# Patient Record
Sex: MALE | Race: WHITE | NOT HISPANIC OR LATINO | Employment: OTHER | URBAN - METROPOLITAN AREA
[De-identification: names, ages, dates, MRNs, and addresses within clinical notes are randomized per-mention and may not be internally consistent; named-entity substitution may affect disease eponyms.]

---

## 2023-06-19 ENCOUNTER — HOSPITAL ENCOUNTER (INPATIENT)
Facility: CLINIC | Age: 78
LOS: 2 days | Discharge: HOME | DRG: 308 | End: 2023-06-21
Attending: INTERNAL MEDICINE | Admitting: INTERNAL MEDICINE
Payer: MEDICARE

## 2023-06-19 DIAGNOSIS — I48.91 ATRIAL FIBRILLATION WITH RVR (H): Primary | ICD-10-CM

## 2023-06-19 DIAGNOSIS — G62.9 NEUROPATHY: ICD-10-CM

## 2023-06-19 PROCEDURE — 200N000001 HC R&B ICU

## 2023-06-19 RX ORDER — NICOTINE POLACRILEX 4 MG
15-30 LOZENGE BUCCAL
Status: DISCONTINUED | OUTPATIENT
Start: 2023-06-19 | End: 2023-06-21 | Stop reason: HOSPADM

## 2023-06-19 RX ORDER — ONDANSETRON 4 MG/1
4 TABLET, ORALLY DISINTEGRATING ORAL EVERY 6 HOURS PRN
Status: DISCONTINUED | OUTPATIENT
Start: 2023-06-19 | End: 2023-06-21 | Stop reason: HOSPADM

## 2023-06-19 RX ORDER — DEXTROSE MONOHYDRATE 25 G/50ML
25-50 INJECTION, SOLUTION INTRAVENOUS
Status: DISCONTINUED | OUTPATIENT
Start: 2023-06-19 | End: 2023-06-21 | Stop reason: HOSPADM

## 2023-06-19 RX ORDER — AMOXICILLIN 250 MG
1 CAPSULE ORAL 2 TIMES DAILY PRN
Status: DISCONTINUED | OUTPATIENT
Start: 2023-06-19 | End: 2023-06-21 | Stop reason: HOSPADM

## 2023-06-19 RX ORDER — ONDANSETRON 2 MG/ML
4 INJECTION INTRAMUSCULAR; INTRAVENOUS EVERY 6 HOURS PRN
Status: DISCONTINUED | OUTPATIENT
Start: 2023-06-19 | End: 2023-06-21 | Stop reason: HOSPADM

## 2023-06-19 RX ORDER — AMOXICILLIN 250 MG
2 CAPSULE ORAL 2 TIMES DAILY PRN
Status: DISCONTINUED | OUTPATIENT
Start: 2023-06-19 | End: 2023-06-21 | Stop reason: HOSPADM

## 2023-06-19 RX ORDER — DILTIAZEM HCL/D5W 125 MG/125
5-15 PLASTIC BAG, INJECTION (ML) INTRAVENOUS CONTINUOUS
Status: DISCONTINUED | OUTPATIENT
Start: 2023-06-20 | End: 2023-06-20

## 2023-06-20 ENCOUNTER — APPOINTMENT (OUTPATIENT)
Dept: CARDIOLOGY | Facility: CLINIC | Age: 78
DRG: 308 | End: 2023-06-20
Attending: HOSPITALIST
Payer: MEDICARE

## 2023-06-20 LAB
ALBUMIN SERPL BCG-MCNC: 3.6 G/DL (ref 3.5–5.2)
ALP SERPL-CCNC: 212 U/L (ref 40–129)
ALT SERPL W P-5'-P-CCNC: 52 U/L (ref 0–70)
ANION GAP SERPL CALCULATED.3IONS-SCNC: 10 MMOL/L (ref 7–15)
ANION GAP SERPL CALCULATED.3IONS-SCNC: 14 MMOL/L (ref 7–15)
AST SERPL W P-5'-P-CCNC: 46 U/L (ref 0–45)
BILIRUB SERPL-MCNC: 0.6 MG/DL
BUN SERPL-MCNC: 23.9 MG/DL (ref 8–23)
BUN SERPL-MCNC: 26.3 MG/DL (ref 8–23)
CALCIUM SERPL-MCNC: 8.8 MG/DL (ref 8.8–10.2)
CALCIUM SERPL-MCNC: 9.2 MG/DL (ref 8.8–10.2)
CHLORIDE SERPL-SCNC: 98 MMOL/L (ref 98–107)
CHLORIDE SERPL-SCNC: 99 MMOL/L (ref 98–107)
CREAT SERPL-MCNC: 1.61 MG/DL (ref 0.67–1.17)
CREAT SERPL-MCNC: 1.72 MG/DL (ref 0.67–1.17)
DEPRECATED HCO3 PLAS-SCNC: 24 MMOL/L (ref 22–29)
DEPRECATED HCO3 PLAS-SCNC: 27 MMOL/L (ref 22–29)
ERYTHROCYTE [DISTWIDTH] IN BLOOD BY AUTOMATED COUNT: 15.7 % (ref 10–15)
ERYTHROCYTE [DISTWIDTH] IN BLOOD BY AUTOMATED COUNT: 15.7 % (ref 10–15)
GFR SERPL CREATININE-BSD FRML MDRD: 40 ML/MIN/1.73M2
GFR SERPL CREATININE-BSD FRML MDRD: 43 ML/MIN/1.73M2
GLUCOSE BLDC GLUCOMTR-MCNC: 116 MG/DL (ref 70–99)
GLUCOSE BLDC GLUCOMTR-MCNC: 123 MG/DL (ref 70–99)
GLUCOSE BLDC GLUCOMTR-MCNC: 127 MG/DL (ref 70–99)
GLUCOSE SERPL-MCNC: 108 MG/DL (ref 70–99)
GLUCOSE SERPL-MCNC: 113 MG/DL (ref 70–99)
HBA1C MFR BLD: 5.9 %
HCT VFR BLD AUTO: 37.7 % (ref 40–53)
HCT VFR BLD AUTO: 38.9 % (ref 40–53)
HGB BLD-MCNC: 12.5 G/DL (ref 13.3–17.7)
HGB BLD-MCNC: 12.7 G/DL (ref 13.3–17.7)
LVEF ECHO: NORMAL
MAGNESIUM SERPL-MCNC: 2.1 MG/DL (ref 1.7–2.3)
MAGNESIUM SERPL-MCNC: 2.1 MG/DL (ref 1.7–2.3)
MCH RBC QN AUTO: 30.3 PG (ref 26.5–33)
MCH RBC QN AUTO: 30.5 PG (ref 26.5–33)
MCHC RBC AUTO-ENTMCNC: 32.6 G/DL (ref 31.5–36.5)
MCHC RBC AUTO-ENTMCNC: 33.2 G/DL (ref 31.5–36.5)
MCV RBC AUTO: 92 FL (ref 78–100)
MCV RBC AUTO: 93 FL (ref 78–100)
PLATELET # BLD AUTO: 169 10E3/UL (ref 150–450)
PLATELET # BLD AUTO: 177 10E3/UL (ref 150–450)
POTASSIUM SERPL-SCNC: 3.5 MMOL/L (ref 3.4–5.3)
POTASSIUM SERPL-SCNC: 3.9 MMOL/L (ref 3.4–5.3)
PROT SERPL-MCNC: 6.5 G/DL (ref 6.4–8.3)
RBC # BLD AUTO: 4.1 10E6/UL (ref 4.4–5.9)
RBC # BLD AUTO: 4.19 10E6/UL (ref 4.4–5.9)
SODIUM SERPL-SCNC: 136 MMOL/L (ref 136–145)
SODIUM SERPL-SCNC: 136 MMOL/L (ref 136–145)
TROPONIN T SERPL HS-MCNC: 73 NG/L
TROPONIN T SERPL HS-MCNC: 74 NG/L
TSH SERPL DL<=0.005 MIU/L-ACNC: 2.83 UIU/ML (ref 0.3–4.2)
WBC # BLD AUTO: 6.2 10E3/UL (ref 4–11)
WBC # BLD AUTO: 6.6 10E3/UL (ref 4–11)

## 2023-06-20 PROCEDURE — 250N000011 HC RX IP 250 OP 636

## 2023-06-20 PROCEDURE — 83735 ASSAY OF MAGNESIUM: CPT

## 2023-06-20 PROCEDURE — 84484 ASSAY OF TROPONIN QUANT: CPT | Performed by: INTERNAL MEDICINE

## 2023-06-20 PROCEDURE — 83735 ASSAY OF MAGNESIUM: CPT | Performed by: HOSPITALIST

## 2023-06-20 PROCEDURE — 85027 COMPLETE CBC AUTOMATED: CPT

## 2023-06-20 PROCEDURE — 250N000013 HC RX MED GY IP 250 OP 250 PS 637: Performed by: HOSPITALIST

## 2023-06-20 PROCEDURE — 80053 COMPREHEN METABOLIC PANEL: CPT

## 2023-06-20 PROCEDURE — 84484 ASSAY OF TROPONIN QUANT: CPT

## 2023-06-20 PROCEDURE — 99223 1ST HOSP IP/OBS HIGH 75: CPT | Mod: AI | Performed by: INTERNAL MEDICINE

## 2023-06-20 PROCEDURE — 93005 ELECTROCARDIOGRAM TRACING: CPT

## 2023-06-20 PROCEDURE — 255N000002 HC RX 255 OP 636: Performed by: HOSPITALIST

## 2023-06-20 PROCEDURE — 250N000013 HC RX MED GY IP 250 OP 250 PS 637: Performed by: INTERNAL MEDICINE

## 2023-06-20 PROCEDURE — 36415 COLL VENOUS BLD VENIPUNCTURE: CPT

## 2023-06-20 PROCEDURE — 120N000004 HC R&B MS OVERFLOW

## 2023-06-20 PROCEDURE — 82374 ASSAY BLOOD CARBON DIOXIDE: CPT

## 2023-06-20 PROCEDURE — 250N000009 HC RX 250: Performed by: HOSPITALIST

## 2023-06-20 PROCEDURE — 999N000208 ECHOCARDIOGRAM COMPLETE

## 2023-06-20 PROCEDURE — 36415 COLL VENOUS BLD VENIPUNCTURE: CPT | Performed by: INTERNAL MEDICINE

## 2023-06-20 PROCEDURE — 93306 TTE W/DOPPLER COMPLETE: CPT | Mod: 26 | Performed by: INTERNAL MEDICINE

## 2023-06-20 PROCEDURE — 84443 ASSAY THYROID STIM HORMONE: CPT | Performed by: INTERNAL MEDICINE

## 2023-06-20 PROCEDURE — 99223 1ST HOSP IP/OBS HIGH 75: CPT | Performed by: INTERNAL MEDICINE

## 2023-06-20 PROCEDURE — 83036 HEMOGLOBIN GLYCOSYLATED A1C: CPT | Performed by: HOSPITALIST

## 2023-06-20 RX ORDER — LATANOPROST 50 UG/ML
1 SOLUTION/ DROPS OPHTHALMIC EVERY EVENING
Status: DISCONTINUED | OUTPATIENT
Start: 2023-06-20 | End: 2023-06-21 | Stop reason: HOSPADM

## 2023-06-20 RX ORDER — APIXABAN 2.5 MG/1
1 TABLET, FILM COATED ORAL 2 TIMES DAILY
COMMUNITY
Start: 2023-06-12

## 2023-06-20 RX ORDER — ACETAMINOPHEN 325 MG/1
650 TABLET ORAL EVERY 4 HOURS PRN
Status: DISCONTINUED | OUTPATIENT
Start: 2023-06-20 | End: 2023-06-21 | Stop reason: HOSPADM

## 2023-06-20 RX ORDER — TEMAZEPAM 7.5 MG/1
7.5 CAPSULE ORAL
Status: ON HOLD | COMMUNITY
End: 2023-06-20

## 2023-06-20 RX ORDER — TEMAZEPAM 7.5 MG/1
30 CAPSULE ORAL AT BEDTIME
Status: DISCONTINUED | OUTPATIENT
Start: 2023-06-20 | End: 2023-06-21 | Stop reason: HOSPADM

## 2023-06-20 RX ORDER — AMOXICILLIN 250 MG
2 CAPSULE ORAL DAILY PRN
COMMUNITY

## 2023-06-20 RX ORDER — PRAVASTATIN SODIUM 20 MG
40 TABLET ORAL AT BEDTIME
Status: DISCONTINUED | OUTPATIENT
Start: 2023-06-20 | End: 2023-06-20

## 2023-06-20 RX ORDER — POTASSIUM CHLORIDE 1500 MG/1
1 TABLET, EXTENDED RELEASE ORAL
COMMUNITY
Start: 2023-05-30

## 2023-06-20 RX ORDER — TORSEMIDE 20 MG/1
40 TABLET ORAL
Status: DISCONTINUED | OUTPATIENT
Start: 2023-06-20 | End: 2023-06-21 | Stop reason: HOSPADM

## 2023-06-20 RX ORDER — TEMAZEPAM 30 MG
1 CAPSULE ORAL AT BEDTIME
COMMUNITY
Start: 2023-05-25

## 2023-06-20 RX ORDER — CEPHALEXIN 500 MG/1
500 CAPSULE ORAL 3 TIMES DAILY
Status: DISCONTINUED | OUTPATIENT
Start: 2023-06-20 | End: 2023-06-21 | Stop reason: HOSPADM

## 2023-06-20 RX ORDER — SIMETHICONE 80 MG
80 TABLET,CHEWABLE ORAL EVERY 6 HOURS PRN
Status: DISCONTINUED | OUTPATIENT
Start: 2023-06-20 | End: 2023-06-21 | Stop reason: HOSPADM

## 2023-06-20 RX ORDER — LISINOPRIL 2.5 MG/1
1 TABLET ORAL DAILY
COMMUNITY
Start: 2023-04-20

## 2023-06-20 RX ORDER — IBUPROFEN 200 MG
400 TABLET ORAL EVERY 8 HOURS PRN
Status: ON HOLD | COMMUNITY
End: 2023-06-21

## 2023-06-20 RX ORDER — LATANOPROST 50 UG/ML
1 SOLUTION/ DROPS OPHTHALMIC EVERY EVENING
COMMUNITY
Start: 2023-02-27

## 2023-06-20 RX ORDER — ATORVASTATIN CALCIUM 20 MG/1
40 TABLET, FILM COATED ORAL EVERY EVENING
Status: DISCONTINUED | OUTPATIENT
Start: 2023-06-20 | End: 2023-06-20

## 2023-06-20 RX ORDER — GABAPENTIN 300 MG/1
600 CAPSULE ORAL 3 TIMES DAILY
Status: DISCONTINUED | OUTPATIENT
Start: 2023-06-20 | End: 2023-06-21 | Stop reason: HOSPADM

## 2023-06-20 RX ORDER — METOPROLOL TARTRATE 25 MG/1
25 TABLET, FILM COATED ORAL EVERY 6 HOURS
Status: DISCONTINUED | OUTPATIENT
Start: 2023-06-20 | End: 2023-06-21

## 2023-06-20 RX ORDER — CEPHALEXIN 500 MG/1
1 CAPSULE ORAL 3 TIMES DAILY
COMMUNITY
Start: 2023-06-19

## 2023-06-20 RX ORDER — CYCLOBENZAPRINE HCL 10 MG
.5-1 TABLET ORAL 3 TIMES DAILY
COMMUNITY
Start: 2023-06-14

## 2023-06-20 RX ORDER — ATORVASTATIN CALCIUM 20 MG/1
40 TABLET, FILM COATED ORAL AT BEDTIME
Status: DISCONTINUED | OUTPATIENT
Start: 2023-06-20 | End: 2023-06-21 | Stop reason: HOSPADM

## 2023-06-20 RX ORDER — FUROSEMIDE 20 MG
3-4 TABLET ORAL 2 TIMES DAILY
Status: ON HOLD | COMMUNITY
Start: 2023-04-20 | End: 2023-06-21

## 2023-06-20 RX ORDER — TIMOLOL MALEATE 5 MG/ML
1 SOLUTION/ DROPS OPHTHALMIC EVERY MORNING
COMMUNITY
Start: 2023-04-03

## 2023-06-20 RX ORDER — DILTIAZEM HYDROCHLORIDE 60 MG/1
2 TABLET, FILM COATED ORAL 2 TIMES DAILY
COMMUNITY
Start: 2023-05-24

## 2023-06-20 RX ORDER — POTASSIUM CHLORIDE 1500 MG/1
40 TABLET, EXTENDED RELEASE ORAL ONCE
Status: COMPLETED | OUTPATIENT
Start: 2023-06-20 | End: 2023-06-20

## 2023-06-20 RX ORDER — TIMOLOL MALEATE 5 MG/ML
1 SOLUTION/ DROPS OPHTHALMIC EVERY MORNING
Status: DISCONTINUED | OUTPATIENT
Start: 2023-06-20 | End: 2023-06-21 | Stop reason: HOSPADM

## 2023-06-20 RX ORDER — FLUTICASONE FUROATE AND VILANTEROL 100; 25 UG/1; UG/1
1 POWDER RESPIRATORY (INHALATION) DAILY
Status: DISCONTINUED | OUTPATIENT
Start: 2023-06-20 | End: 2023-06-21 | Stop reason: HOSPADM

## 2023-06-20 RX ORDER — NYSTATIN 100000 U/G
1 CREAM TOPICAL 2 TIMES DAILY
COMMUNITY
Start: 2023-06-19

## 2023-06-20 RX ORDER — ZOLPIDEM TARTRATE 5 MG/1
5 TABLET ORAL
Status: DISCONTINUED | OUTPATIENT
Start: 2023-06-20 | End: 2023-06-20

## 2023-06-20 RX ORDER — GABAPENTIN 300 MG/1
600 CAPSULE ORAL 3 TIMES DAILY
Status: ON HOLD | COMMUNITY
Start: 2023-06-09 | End: 2023-06-21

## 2023-06-20 RX ORDER — GABAPENTIN 300 MG/1
300 CAPSULE ORAL 3 TIMES DAILY
Status: DISCONTINUED | OUTPATIENT
Start: 2023-06-20 | End: 2023-06-20

## 2023-06-20 RX ORDER — CARVEDILOL 3.12 MG/1
2 TABLET ORAL 2 TIMES DAILY WITH MEALS
Status: ON HOLD | COMMUNITY
Start: 2023-05-15 | End: 2023-06-21

## 2023-06-20 RX ORDER — GABAPENTIN 100 MG/1
100 CAPSULE ORAL 3 TIMES DAILY
Status: ON HOLD | COMMUNITY
End: 2023-06-21

## 2023-06-20 RX ORDER — ATORVASTATIN CALCIUM 40 MG/1
40 TABLET, FILM COATED ORAL AT BEDTIME
COMMUNITY
Start: 2023-04-28

## 2023-06-20 RX ADMIN — HUMAN ALBUMIN MICROSPHERES AND PERFLUTREN 2 ML: 10; .22 INJECTION, SOLUTION INTRAVENOUS at 11:33

## 2023-06-20 RX ADMIN — Medication 5 MG/HR: at 00:11

## 2023-06-20 RX ADMIN — ALLOPURINOL 150 MG: 300 TABLET ORAL at 07:51

## 2023-06-20 RX ADMIN — GABAPENTIN 600 MG: 300 CAPSULE ORAL at 20:25

## 2023-06-20 RX ADMIN — LATANOPROST 1 DROP: 50 SOLUTION OPHTHALMIC at 18:31

## 2023-06-20 RX ADMIN — TIMOLOL MALEATE 1 DROP: 5 SOLUTION/ DROPS OPHTHALMIC at 11:47

## 2023-06-20 RX ADMIN — METOPROLOL TARTRATE 25 MG: 25 TABLET, FILM COATED ORAL at 13:57

## 2023-06-20 RX ADMIN — ZOLPIDEM TARTRATE 5 MG: 5 TABLET ORAL at 02:09

## 2023-06-20 RX ADMIN — GABAPENTIN 300 MG: 300 CAPSULE ORAL at 07:49

## 2023-06-20 RX ADMIN — APIXABAN 2.5 MG: 2.5 TABLET, FILM COATED ORAL at 20:25

## 2023-06-20 RX ADMIN — ACETAMINOPHEN 650 MG: 325 TABLET, FILM COATED ORAL at 23:39

## 2023-06-20 RX ADMIN — MICONAZOLE NITRATE: 20 POWDER TOPICAL at 11:46

## 2023-06-20 RX ADMIN — TEMAZEPAM 30 MG: 7.5 CAPSULE ORAL at 20:32

## 2023-06-20 RX ADMIN — APIXABAN 2.5 MG: 2.5 TABLET, FILM COATED ORAL at 07:49

## 2023-06-20 RX ADMIN — GABAPENTIN 600 MG: 300 CAPSULE ORAL at 13:58

## 2023-06-20 RX ADMIN — ATORVASTATIN CALCIUM 40 MG: 20 TABLET, FILM COATED ORAL at 20:25

## 2023-06-20 RX ADMIN — POTASSIUM CHLORIDE 40 MEQ: 1500 TABLET, EXTENDED RELEASE ORAL at 15:30

## 2023-06-20 RX ADMIN — METOPROLOL TARTRATE 25 MG: 25 TABLET, FILM COATED ORAL at 08:01

## 2023-06-20 RX ADMIN — POTASSIUM CHLORIDE 40 MEQ: 1500 TABLET, EXTENDED RELEASE ORAL at 07:48

## 2023-06-20 RX ADMIN — METOPROLOL TARTRATE 25 MG: 25 TABLET, FILM COATED ORAL at 20:25

## 2023-06-20 RX ADMIN — SIMETHICONE 80 MG: 80 TABLET, CHEWABLE ORAL at 04:53

## 2023-06-20 RX ADMIN — FLUTICASONE FUROATE AND VILANTEROL TRIFENATATE 1 PUFF: 100; 25 POWDER RESPIRATORY (INHALATION) at 11:46

## 2023-06-20 RX ADMIN — ACETAMINOPHEN 650 MG: 325 TABLET, FILM COATED ORAL at 15:29

## 2023-06-20 RX ADMIN — MICONAZOLE NITRATE: 20 POWDER TOPICAL at 20:29

## 2023-06-20 RX ADMIN — CEPHALEXIN 500 MG: 500 CAPSULE ORAL at 13:58

## 2023-06-20 RX ADMIN — TORSEMIDE 40 MG: 20 TABLET ORAL at 15:30

## 2023-06-20 RX ADMIN — CEPHALEXIN 500 MG: 500 CAPSULE ORAL at 20:27

## 2023-06-20 ASSESSMENT — ACTIVITIES OF DAILY LIVING (ADL)
EQUIPMENT_CURRENTLY_USED_AT_HOME: WALKER, ROLLING
WEAR_GLASSES_OR_BLIND: NO
ADLS_ACUITY_SCORE: 25
TRANSFERRING: 0-->INDEPENDENT
DRESSING/BATHING_DIFFICULTY: NO
ADLS_ACUITY_SCORE: 24
FALL_HISTORY_WITHIN_LAST_SIX_MONTHS: NO
WALKING_OR_CLIMBING_STAIRS_DIFFICULTY: YES
ADLS_ACUITY_SCORE: 24
ADLS_ACUITY_SCORE: 24
ADLS_ACUITY_SCORE: 25
ADLS_ACUITY_SCORE: 18
DOING_ERRANDS_INDEPENDENTLY_DIFFICULTY: NO
ADLS_ACUITY_SCORE: 25
TRANSFERRING: 0-->INDEPENDENT
CHANGE_IN_FUNCTIONAL_STATUS_SINCE_ONSET_OF_CURRENT_ILLNESS/INJURY: NO
ADLS_ACUITY_SCORE: 24
TOILETING_ISSUES: NO
ADLS_ACUITY_SCORE: 18
WALKING_OR_CLIMBING_STAIRS: AMBULATION DIFFICULTY, REQUIRES EQUIPMENT
DIFFICULTY_EATING/SWALLOWING: NO
CONCENTRATING,_REMEMBERING_OR_MAKING_DECISIONS_DIFFICULTY: NO
ADLS_ACUITY_SCORE: 24

## 2023-06-20 NOTE — PLAN OF CARE
"Goal Outcome Evaluation:         Increased diltiazem drip to 10mg/hour, 's -120's. Pt denied SOB upon admission but not states some difficulty \"feels like my airway is blocked\" 95% RA and RR20-24. Pt cannot sleep flat, he sleeps in a recliner at home. Assisted pt to more upright position and appiled 2L NC                "

## 2023-06-20 NOTE — PROGRESS NOTES
"[place] [facility] ADMISSION NOTE    Patient admitted to room ICU1 at approximately 2345 via cart with EMS Patient was accompanied by  2 medics  Verbal SBAR report received from Anna ARMSTRONG [facility] prior to patient arrival.     Patient trasferred to bed via slide sheet Patient alert and oriented X 3. The patient is not having any pain.  . Admission vital signs: Blood pressure 127/86, pulse 111, temperature 99.1  F (37.3  C), temperature source Oral, resp. rate 14, height 1.791 m (5' 10.5\"), weight 89.8 kg (197 lb 15.6 oz), SpO2 95 %. Patient was oriented to plan of care, call light, tv, telephone, bathroom and visiting hours.     Risk Assessment    The following safety risks were identified during admission: fall. Yellow risk band applied: YES.     Skin Initial Assessment    This writer admitted this patient and completed a full skin assessment and Bartolome score in the Adult PCS flowsheet. Appropriate interventions initiated as needed.     Secondary skin check completed by Christina ARMSTRONG.         Education    Patient has a Sterrett to Observation order: No  Observation education completed and documented: N/A      Mylene Petersen RN    "

## 2023-06-20 NOTE — PROGRESS NOTES
End Of Shift Note    Situation: Inc SOA at home. Was sent to [facility] from [facility] in [place] for new onset Afib RVR. Echo shows EF 30-35% Severe hypokinesis of Anterio/inferio septal walls.     Plan: Off Dilt gtt. Metoprolol started, Tordemide started in place of Lasix, Eliquis continues from home meds. (HX DVT) Monitor overnight, if stable discharge home with cardiology follow up.     Subjective/Objective:    Neuro: A and O times 4, forgetful    Cardiac: Aflutter, rate controlled, BP wnl, T max 99.2    Resp: Off O2, uses inhaler daily which helps. Lungs dim, faint crackles in LLL. O2 sat on room air over 92%. Soa with activity     GI/: Eating drinking and voiding adequate thru supra pubic cath (Hx of neurogenic bladder, spine issues) treating for UTI with oral Keflex, which was started a few days ago.    Endo: DM2 not on any treatment, A1C 5.9    MSK: Uses walker, legs weak, sat in recliner for a bit    Skin: Small open scaly arreas on buttocks, meplex applied    LDAs: PIV x2 SL

## 2023-06-20 NOTE — UTILIZATION REVIEW
Admission Status; Secondary Review Determination    Under the authority of the Utilization Management Committee, the utilization review process indicated a secondary review on the above patient. The review outcome is based on review of the medical records, discussions with staff, and applying clinical experience noted on the date of the review.    (x) Inpatient Status Appropriate - This patient's medical care is consistent with medical management for inpatient care and reasonable inpatient medical practice.    RATIONALE FOR DETERMINATION: 78-year-old male with significant history of hypertension, cardiomyopathy, CAD, suprapubic catheter with recurrent UTIs who was transferred from outpatient setting with complaints of dyspnea and found to have atrial fibrillation with rapid ventricular response in the 120s-140s.  Recent urinalysis revealed large leukocyte Estrace, repeat echocardiogram reveals severe cardiomyopathy with EF 30-35% that has worsened compared to outside hospitals echocardiogram in October 2020.  Patient requiring initial intensive care unit management with intravenous diltiazem for adequate rate control as well as management of clinical heart failure most likely secondary to patient's RVR.  Patient will need adjustment in medications as he developed hypotension on the afternoon of hospital day 2 with a blood pressure of 85/78 in order to adequately control recurrent RVR while managing patient's cardiomyopathy.  Patient expected to require greater than 2 nights in the hospital to accomplish this appropriate for inpatient care.    At the time of admission with the information available to the attending physician more than 2 nights Hospital complex care was anticipated, based on patient risk of adverse outcome if treated as outpatient and complex care required. Inpatient admission is appropriate based on the Medicare guidelines.    This document was produced using voice recognition software    The  information on this document is developed by the utilization review team in order for the business office to ensure compliance. This only denotes the appropriateness of proper admission status and does not reflect the quality of care rendered.    The definitions of Inpatient Status and Observation Status used in making the determination above are those provided in the CMS Coverage Manual, Chapter 1 and Chapter 6, section 70.4.    Sincerely,    Addison Ortega MD  Utilization Review  Physician Advisor  [facility].

## 2023-06-20 NOTE — PLAN OF CARE
"Goal Outcome Evaluation:replace pt's leg bag with a large volume bag. Pt had 750 mls output since admission to ICU. Pt taking sips of water t/o the night. He did have some gas discomfort and stated relief after a simethicone tablet. Pt awake alert, seems to have some forgetfullness. He was unsure of some of his medication doses and states some of the medications on the epic list are old and he is not taking them anymore. He asked several questions regarding Afib and did ask some of the same questions again . He states his \"wife didn't fill him in on all the details\". Remains A-Fib around 100 with pvc's. BP adequate.                        "

## 2023-06-20 NOTE — PROGRESS NOTES
[facility]    Medicine Progress Note - Hospitalist Service    Date of Admission:  6/19/2023    Assessment & Plan   78-year-old male with history of HFrEF (30-35%), neurogenic bladder s/p suprapubic catheter, HTN, HLD, CAD s/p PCI, OA s/p TKA, history of LLE DVT on Eliquis, cataract, diabetes mellitus type 2, and gout presented as a transfer from [facility] on 6/20 after initially presenting with dyspnea, and found to be in atrial fibrillation with RVR, -140s.  Started on diltiazem drip, also treated with dose of Lasix and Ceftriaxone due to possible UTI, and transferred to [facility]. Shortly after arrival at [facility], patient seemed to go into rate controlled atrial flutter.  Cardiology was consulted to assess whether patient would be a candidate for cardioversion given rate controlled flutter. TTE was done which noted EF 30-35%, moderate-severe hypokinesis of anteroseptal/septal walls, trace MR and TR, no pericardial effusion.  Was restarted on diuretics per cardiology recs.    -Monitor rate control on Metoprolol; Diltiazem stopped. Restarted on diuretics. If stable over next 24 hours potentially discharge home with outpatient cardiology follow up.    Atrial Flutter with RVR  New Onset Atrial Fibrillation/Flutter  -Heart rate improved, but still in atrial flutter  -Cardio consulted, appreciate recs  -Metoprolol 25 mg q6H (will switch to BID tomorrow if remains stable)   -Can consider switching back to Coreg in future given reduced EF  -Diltiazem stopped  -Already on Eliquis for DVT  -Will replete K > 4 and Mag > 2  -Will try to discharge with cardiac monitor    Acute on Chronic HFrEF (30-35%)  Hx of ICM  CAD s/p PCI  HTN  -Daily Weight, I/O  -Start Torsemide 40 mg BID (was previously on Lasix 60-80 mg BID)  -Hold Lisinopril 2.5 mg Daily, plan to resume tomorrow  -Continue BB  -Continue Lipitor    Hx of LLE DVT  -Continue home Eliquis 2.5 mg BID (age > 80, Cr >  "1.5)    Possible UTI  Neurogenic Bladder s/p Suprapubic Catheter  -Keflex 500 mg TID (resumed from PTA)    Hx of Diabetes Mellitus Type 2  Not on any meds PTA.  -Check A1c  -Continue Gabapentin (renally dosed)    Chronic Problems  Gout  -Continue home Allopurinol    Cataract  -Continue home eye drops    CKD Stage 3  Recent lab work shows Cr ~ 1.9-2. Cr 1.7-1.8 on admission at [facility].  -Continue to monitor renal function on diuretis    Insomnia  -Continue Temazepam 30 mg at bedtime    Miscellaneous  - Breo (home Symbicort); unclear if takes it for COPD or asthma       Diet: 2 Gram Sodium Diet    DVT Prophylaxis: DOAC  Fabian Catheter: Not present  Lines: None     Cardiac Monitoring: ACTIVE order. Indication: Tachyarrhythmias, acute (48 hours)  Code Status: No CPR- Do NOT Intubate      Clinically Significant Risk Factors Present on Admission               # Drug Induced Coagulation Defect: home medication list includes an anticoagulant medication    # Hypertension: Home medication list includes antihypertensive(s)  # Chronic heart failure with reduced ejection fraction: last echo with EF <40%     # Overweight: Estimated body mass index is 27.94 kg/m  as calculated from the following:    Height as of this encounter: 1.791 m (5' 10.5\").    Weight as of this encounter: 89.6 kg (197 lb 8.5 oz).            Disposition Plan      Expected Discharge Date: 06/21/2023      Destination: home with family            Nir Boyer MD  Hospitalist Service  [facility]  Securely message with [employer] (more info)  Text page via [employer] Paging/Directory   ______________________________________________________________________    Interval History   No acute events overnight.     Patient seen and evaluated at bedside. Doing okay this morning. Denies any chest pain or chest discomfort, no shortness of breath.  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    Spoke to patient and wife about atrial fibrillation/flutter and having some cardiologist assess the patient that sometimes is worthwhile to cardiovert people out of atrial flutter.    Physical Exam   Vital Signs: Temp: 99.1  F (37.3  C) Temp src: Oral BP: 118/88 Pulse: 85   Resp: 24 SpO2: 98 % O2 Device: Nasal cannula Oxygen Delivery: 1 LPM  Weight: 197 lbs 8.51 oz    General: Sitting up in bed, no acute distress  CV: +S1/S2, no significant pitting edema  Respiratory: clear to auscultation bilaterally, no wheeze/crackles  GI: soft, NT, ND, suprapubic catheter in place without any surrounding erythema or signs of infection  Neuro: alert    Medical Decision Making       65 MINUTES SPENT BY ME on the date of service doing chart review, history, exam, documentation & further activities per the note.      Data     I have personally reviewed the following data over the past 24 hrs:    6.2  \   12.5 (L)   / 177     136 99 23.9 (H) /  127 (H)   3.5 27 1.61 (H) \       ALT: 52 AST: 46 (H) AP: 212 (H) TBILI: 0.6   ALB: 3.6 TOT PROTEIN: 6.5 LIPASE: N/A       Trop: 73 (H) BNP: N/A       TSH: 2.83 T4: N/A A1C: N/A       Imaging results reviewed over the past 24 hrs:   Recent Results (from the past 24 hour(s))   XR CHEST 1 VIEW PA OR AP    Narrative    For Patients:  As a result of the 21st Century Cures Act, medical imaging exams and procedure reports are released immediately into your electronic medical record.  You may view this report before your referring provider.  If you have questions, please contact your health care provider.      INDICATION:  Chest pain    COMPARISON:  June 18, 2023    TECHNIQUE:  Single-view study June 19, 2023 at 6:21 p.m.    FINDINGS:  TUBES AND LINES: None.    HEART AND MEDIASTINUM: Mildly enlarged heart unchanged appearance.    LUNGS AND PLEURAL SPACES: Probable mild CHF. Bibasilar airspace process probably atelectasis.The pleural  spaces are unremarkable.    OSSEOUS STRUCTURES: Age-appropriate appearance. No acute focal finding.    Impression    CHF pattern which is new. Bibasilar atelectasis is slightly worsened. Enlarged heart unchanged.        Dictated by Jordan Mednieta MD @ 2023 7:17:56 PM    (Electronically Signed)   Echocardiogram Complete   Result Value    LVEF  30-35%    Narrative    [id number]  [id number]  [id number]  [id number]^FLORENCIO^SHRUTHI     [facility]  Echocardiography Laboratory  [address] [place] [address]     Name: DEEPALI LAUREANO  MRN: [id number]  : 1945  Study Date: 2023 11:03 AM  Age: 78 yrs  Gender: Male  Patient Location: [facility]  Reason For Study: Atrial Fibrillation  Ordering Physician: SHRUTHI MATIAS  Performed By: Ann Delcid RDCS     BSA: 2.1 m2  Height: 71 in  Weight: 195 lb  BP: 121/92 mmHg  ______________________________________________________________________________  Procedure  Complete Portable Echo Adult. Optison (NDC #[id number]) given intravenously.  ______________________________________________________________________________  Interpretation Summary     1. The left ventricle is normal in size. Left ventricular systolic function is  severely reduced. The visual ejection fraction is 30-35%. Diastolic function  not assessed due to atrial fibrillation. There is moderate to severe  hypokinesis of the mid anteroseptal/inferoseptal walls and all apical segments  of the left ventricle. There is no thrombus seen in the left ventricle.  2. The right ventricle is normal size. The right ventricular systolic function  is normal.  3. Trace mitral and tricuspid regurgitation.  4. No pericardial effusion.  5. In comparison to the previous [facility] report dated 10/12/2020, there has been a  mild interval deterioration in left ventricular systolic function.  ______________________________________________________________________________  Left Ventricle  The left ventricle is normal in  size. Left ventricular systolic function is  severely reduced. The visual ejection fraction is 30-35%. Diastolic function  not assessed due to atrial fibrillation. There is moderate to severe  hypokinesis of the mid anteroseptal/inferoseptal walls and all apical segments  of the left ventricle. There is no thrombus seen in the left ventricle.     Right Ventricle  The right ventricle is normal size. The right ventricular systolic function is  normal.     Atria  There is mild biatrial enlargement. There is no color Doppler evidence of an  atrial shunt.     Mitral Valve  There is trace mitral regurgitation.     Tricuspid Valve  There is trace tricuspid regurgitation. The right ventricular systolic  pressure is approximated at 23.9 mmHg plus the right atrial pressure.     Aortic Valve  The aortic valve is not well visualized. No aortic regurgitation is present.  No aortic stenosis is present.     Pulmonic Valve  There is no pulmonic valvular stenosis.     Vessels  The aortic root is normal size. Normal size ascending aorta. The inferior vena  cava is normal.     Pericardium  There is no pericardial effusion.     Rhythm  The rhythm was atrial fibrillation.  ______________________________________________________________________________  MMode/2D Measurements & Calculations  Ao root diam: 3.5 cm  asc Aorta Diam: 3.1 cm  LA Volume (BP): 80.0 ml  LA Volume Index (BP): 38.3 ml/m2     Doppler Measurements & Calculations  MV E max omaira: 79.8 cm/sec  MV dec time: 0.13 sec  TR max omaira: 244.1 cm/sec  TR max P.9 mmHg     E/E' av.8  Lateral E/e': 7.5  Medial E/e': 12.0     ______________________________________________________________________________  Report approved by: Dior Bailon 2023 12:39 PM

## 2023-06-20 NOTE — H&P
[facility]    History and Physical - Hospitalist Service       Date of Admission:  6/19/2023    Assessment & Plan      Brian Garcia is a 78 year old male admitted on 6/19/2023 for afib RVR, LAURENCE.    Afib RVR. Admit to ICU on diltiazem drip. EKG showed afib with HR in the 120s. Will continue diltiazem drip. There are 2 blood thinners on the record but pharmacy was able to verify that the patient is taking Eliquis. Will resume eliquis and will have patient follow up with PCP for any change in anticoagulation therapy. It is unclear whether patient has had TSH and T4 checked. Will obtain TSH/T4 and have day team reassess.    LAURENCE. Cr 1.72. Likely due to dehydration. Run NS and recheck renal function in AM.    Elevated troponin. Trop 74. Likely demand ischemia. No chest pain reported and EKG negative. Continue to trend troponin.    DVT PPx. Eliquis.    Code status. Full code.    Disposition. Home in 2-3 days.      The patient's care was discussed with the bedside nursing staff        Marquise Guillory MD  [facility]  Securely message with the Vocera Web Console (learn more here)  Text page via [facility] Paging/Directory      Visit/Communication Style   Virtual (Video) communication was used to evaluate Cholo Torres consented to the use of video communication: yes  Video START time: 0300, 6/20/2023  Video STOP time: 0330, 6/20/2023   Patient's location: [facility]   Provider's location during the visit: [facility] Tele-medicine site        ______________________________________________________________________    Chief Complaint   Palpitation.    History is obtained from the patient    History of Present Illness   Brian Garcia is a 78 year old male with past medical history of gout, HLD, HTN, DVT on coumadin, presenting to the ER with complaint of palpitation. The patient however denies any chest pain, shortness of breath, fever, chills,  coughing, nausea, vomiting or diarrhea.    In the ER, the patient was having HR in the 123. EKG came back confirming afib RVR. Diltiazem drip started.      Review of Systems      General: negative for fever, chills, sweats, weakness  Eyes: negative for blurred vision, loss of vision  Ear Nose and Throat: negative for pharyngitis, speech or swallowing difficulties  Respiratory:  negative for sputum production, wheezing, LUCAS, pleuritic pain, sob or cough  Cardiology:  Palpitations, negative for chest pain, orthopnea, PND, edema, syncope   Gastrointestinal: negative for abdominal pain, nausea, vomiting, diarrhea, constipation, hematemesis, melena or hematochezia  Genitourinary: negative for frequency, urgency, dysuria, hematuria   Neurological: negative for focal weakness, paresthesia    Past Medical History    I have reviewed this patient's medical history and updated it with pertinent information if needed.   No past medical history on file.    Past Surgical History   I have reviewed this patient's surgical history and updated it with pertinent information if needed.  No past surgical history on file.    Social History   I have reviewed this patient's social history and updated it with pertinent information if needed.  Social History     Tobacco Use     Smoking status: Unknown       Family History     No significant family history, including no history of:     Prior to Admission Medications   Prior to Admission Medications   Prescriptions Last Dose Informant Patient Reported? Taking?   Calcium Carb-Cholecalciferol (CALCIUM CARBONATE-VITAMIN D3) 600-400 MG-UNIT TABS  at 1800  Yes Yes   Sig: Take 1 tablet by mouth 2 times daily   LORazepam (ATIVAN) 0.5 MG tablet   Yes No   Sig: Take 0.5 mg by mouth nightly as needed And bid prn.    Menthol-Zinc Oxide (CALMOSEPTINE EX)   Yes No   Sig: Apply to buttocks after each BM for cleansing   POTASSIUM CHLORIDE PO 6/19/2023 at 0800  Yes Yes   Sig: Take 20 mEq by mouth 2 times  daily (with meals)   Warfarin Sodium (COUMADIN PO)   Yes No   Sig: Take by mouth See Admin Instructions Take as directed   albuterol (2.5 MG/3ML) 0.083% nebulizer solution   Yes No   Sig: Take 3 mLs by nebulization 3 times daily And prn.    albuterol (PROAIR HFA, PROVENTIL HFA, VENTOLIN HFA) 108 (90 BASE) MCG/ACT inhaler 6/19/2023  Yes Yes   Sig: Inhale 2 puffs into the lungs 4 times daily as needed   allopurinol (ZYLOPRIM) 300 MG tablet 6/19/2023 at 1800  Yes Yes   Sig: Take 300 mg by mouth daily   apixaban ANTICOAGULANT (ELIQUIS) 5 MG tablet   Yes Yes   Sig: Take 5 mg by mouth 2 times daily Pt not sure of dose   aspirin 81 MG chewable tablet   Yes No   Sig: Take 81 mg by mouth daily with food   benazepril (LOTENSIN) 10 MG tablet  at 0800  Yes No   Sig: Take 10 mg by mouth daily   benzonatate (TESSALON) 100 MG capsule   Yes No   Sig: Take 100 mg by mouth 3 times daily as needed   carvedilol (COREG) 6.25 MG tablet 6/19/2023 at 1800  Yes Yes   Sig: Take 18.75 mg by mouth 2 times daily   fexofenadine (ALLEGRA) 180 MG tablet   Yes Yes   Sig: Take 1 tablet by mouth daily with food   fluticasone (FLONASE) 50 MCG/ACT nasal spray   Yes No   Sig: Spray 1 spray into both nostrils daily as needed   gabapentin (NEURONTIN) 100 MG capsule 6/19/2023 at 1800  Yes Yes   Sig: Take 100 mg by mouth 3 times daily 2 tabs  but dose not know the dose   guaiFENesin-codeine (ROBITUSSIN AC) 100-10 MG/5ML SOLN Past Month  Yes Yes   Sig: Take 10 mLs by mouth every 4 hours as needed   morphine (MSIR) 7.5 MG tablet   Yes No   Sig: Take 15 mg by mouth At Bedtime.    nortriptyline (PAMELOR) 10 MG capsule   Yes No   Sig: Take 10 mg by mouth At Bedtime   polyethylene glycol (MIRALAX/GLYCOLAX) powder   Yes No   Sig: Take by mouth daily as needed Take 17 grams po mixed with 6-8 oz of fluid.    pravastatin (PRAVACHOL) 40 MG tablet 6/19/2023 at 2200  Yes Yes   Sig: Take 40 mg by mouth At Bedtime   spironolactone (ALDACTONE) 25 MG tablet   Yes No   Sig:  Take 25 mg by mouth daily   temazepam (RESTORIL) 7.5 MG capsule   Yes Yes   Sig: Take 7.5 mg by mouth nightly as needed for sleep Pt not sure of dose   zolpidem (AMBIEN) 5 MG tablet   Yes No   Sig: Take 5 mg by mouth nightly as needed      Facility-Administered Medications: None     Allergies   Allergies   Allergen Reactions     Penicillins      Spironolactone        Physical Exam   Vital Signs: Temp: 99.1  F (37.3  C) Temp src: Oral BP: 127/86 Pulse: 111   Resp: 14 SpO2: 97 % O2 Device: Nasal cannula Oxygen Delivery: 2 LPM  Weight: 197 lbs 15.57 oz      GENERAL: The patient is not in any acute distressed. Awake and alert.  HEENT: Nonicteric sclerae, PERRLA, EOMI. Oropharynx clear. Moist mucous membranes. Conjunctivae appear well perfused.  HEART: fast rate and irregularly irregular rhythm without murmurs. No lower extremities edema.  LUNGS: Clear to auscultation bilaterally. No wheezing, crackles or rhonchi  ABDOMEN: Soft, positive bowel sounds, nontender.  SKIN: No rash, no excessive bruising, petechiae, or purpura.  NEUROLOGIC: AxO x 3.  Cranial nerves II-XII intact without motor/sensory deficit.      Data     Recent Labs   Lab 06/20/23  0001   WBC 6.6   HGB 12.7*   MCV 93         POTASSIUM 3.9   CHLORIDE 98   CO2 24   BUN 26.3*   CR 1.72*   ANIONGAP 14   PASCALE 9.2   *   ALBUMIN 3.6   PROTTOTAL 6.5   BILITOTAL 0.6   ALKPHOS 212*   ALT 52   AST 46*         Recent Results (from the past 24 hour(s))   XR CHEST 1 VIEW PA OR AP    Narrative    For Patients:  As a result of the 21st Century Cures Act, medical imaging exams and procedure reports are released immediately into your electronic medical record.  You may view this report before your referring provider.  If you have questions, please contact your health care provider.      INDICATION:  Chest pain    COMPARISON:  June 18, 2023    TECHNIQUE:  Single-view study June 19, 2023 at 6:21 p.m.    FINDINGS:  TUBES AND LINES: None.    HEART AND  MEDIASTINUM: Mildly enlarged heart unchanged appearance.    LUNGS AND PLEURAL SPACES: Probable mild CHF. Bibasilar airspace process probably atelectasis.The pleural spaces are unremarkable.    OSSEOUS STRUCTURES: Age-appropriate appearance. No acute focal finding.    Impression    CHF pattern which is new. Bibasilar atelectasis is slightly worsened. Enlarged heart unchanged.        Dictated by Jordan Mendieta MD @ 6/19/2023 7:17:56 PM    (Electronically Signed)

## 2023-06-20 NOTE — CONSULTS
[facility]    Cardiology Consultation     Date of Admission:  6/19/2023    Review of the result(s) of each unique test - Last echocardiogram personally reviewed, ECG personally reviewed, labs BMP outside records.     Assessment & Plan   Brian Garcia is a 78 year old male who was admitted on 6/19/2023.    1. New onset atrial flutter with rapid ventricular response  2. Chronic ischemic cardiomyopathy  3. Acute decompensated heart failure in the setting of above  4. CAD with LAD territory MI history of LAD and circumflex stenting  5. DVT 2022  6. Chronic anticoagulation for above  7. Recurrent UTIs and bladder infection    Plan  1. Patient presented with new onset atrial fibrillation likely in the setting of either acute decompensated heart failure or recurrent bacterial infections with this catheter.  There is a possibility that atrial fibrillation triggered heart failure decompensation alternatively.  In any case he appears quite compensated now.  Oxygenation is improving.  Edema is resolved.  2. Discussed rate versus rhythm control and risk and benefits of long-term anticoagulation.  He is already on anticoagulation for his DVT.  3. At this time his lisinopril and carvedilol have been discontinued.  Diltiazem has been discontinued appropriately.  He is on metoprolol 25 mg every 6 hours p.o.  Recommend continuing that and switching to a twice daily dosing as of tomorrow depending on how he is doing.  Heart rates are much better today.  4. Recommend stopping Lasix and switching to torsemide 40 twice daily.  5. Likely anticipate discharge from a cardiology standpoint in the next 1 to 2 days.  Would recommend an event monitor at discharge.  6. Would strongly recommend very close outpatient follow-up.  [place].  They want to follow-up with [facility].  Please work with care management and have the patient schedule an appointment with [facility] or outreach [facility] clinics per patient and family preference in the next 1 to 2 weeks.  Alternatively if they are willing to see us at [facility], please arrange follow-up with nurse practitioner or our cardiology team here within the next couple of weeks.  7. Depending on clinical course response to antibiotics and heart rate control on cardiac monitoring, further discussions about outpatient rhythm control with cardioversion can be considered.  However given chronic LV dysfunction and other comorbidities, I think he is likely going to need antiarrhythmic therapy such as amiodarone to maintain normal rhythm.    Please call us with questions.  We will be available for assistance if needed however at this time at [facility] we do not have the capacity for routine follow-up and inpatient rounding unless requested by medicine on subsequent days.    This was a high complexity visit addressing acute decompensated heart failure new onset atrial flutter with rapid ventricular response and coordinating care.    High complexity     PAULETTE Queen  Staff Cardiologist  [facility]    History of Present Illness   This is a very pleasant 78-year-old gentleman who has been admitted to [facility] in the ICU for shortness of breath bladder infection and new onset atrial fibrillation flutter.  The patient has longstanding cardiac issues and had been following up with [facility] but has not had recent follow-up in the cardiology clinic.      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  In 2017, his EF was around 35 to 40% and he underwent revascularization with stenting to his circumflex and proximal LAD.  However EF has not recovered since then and has stayed around 35%.  He used to follow-up with cardiology but has not seen them in a long time.  At home he takes 80 twice daily of Lasix along with 3.125 twice  daily of carvedilol recently increased up to 6.25 twice daily.  He takes a low-dose of lisinopril.    Additionally has a history of a suprapubic catheter and recurrent UTIs.  Has a history of DVT diagnosed last year for which he has been on chronic anticoagulation.    Now the patient presented with shortness of breath and was diagnosed to have new onset atrial flutter with rapid ventricular response.  He received IV diuretics.  No anginal symptoms.  Oxygenation improved.  Echocardiogram this admission that I personally reviewed shows LAD territory wall motion abnormality with an EF of around 30%.  Cardiology has been consulted for new atrial flutter.    The patient says that he is overall feeling better.  However still requiring some nasal oxygen.  His edema is resolved.  Diltiazem infusion was started initially which has been discontinued now.  Coreg has been discontinued.  He is on 25 mg every 6 hours of p.o. metoprolol.  Heart rate now in the setting of AF is around 80s to 90s.    Past Medical History   No past medical history on file.    Past Surgical History   No past surgical history on file.    Prior to Admission Medications   Prior to Admission Medications   Prescriptions Last Dose Informant Patient Reported? Taking?   Calcium Carb-Cholecalciferol (CALCIUM CARBONATE-VITAMIN D3) 600-400 MG-UNIT TABS 6/19/2023 at am Self Yes Yes   Sig: Take 1 tablet by mouth 2 times daily   ELIQUIS ANTICOAGULANT 2.5 MG tablet 6/19/2023 at am Self Yes Yes   Sig: Take 1 tablet by mouth 2 times daily Apixaban   Menthol, Topical Analgesic, 6 % GEL 6/18/2023 at am Self Yes Yes   Sig: Apply 1 Application topically daily as needed Teja Cool Gel Therapy for thigh pain.   SYMBICORT 80-4.5 MCG/ACT Inhaler 6/18/2023 at pm Self Yes Yes   Sig: Inhale 2 puffs into the lungs 2 times daily   White Petrolatum (WHITE PETROLEUM JELLY) ointment Past Month at unknown Self Yes Yes   Sig: Apply 1 Application topically 4 times daily as needed To  pressure ulcer   albuterol (PROAIR HFA, PROVENTIL HFA, VENTOLIN HFA) 108 (90 BASE) MCG/ACT inhaler 6/19/2023 at afternoon Self Yes Yes   Sig: Inhale 2 puffs into the lungs 4 times daily as needed   allopurinol (ZYLOPRIM) 300 MG tablet 6/19/2023 at 1800 Self Yes Yes   Sig: Take 300 mg by mouth daily   apixaban ANTICOAGULANT (ELIQUIS) 5 MG tablet dc-wrong strength Self Yes No   Sig: Take 5 mg by mouth 2 times daily Pt not sure of dose   atorvastatin (LIPITOR) 40 MG tablet 6/18/2023 at hs Self Yes Yes   Sig: Take 40 mg by mouth At Bedtime   carvedilol (COREG) 3.125 MG tablet 6/19/2023 at am Self Yes Yes   Sig: Take 2 tablets by mouth 2 times daily (with meals)   cephALEXin (KEFLEX) 500 MG capsule 6/18/2023 at [facility], had one dose only Self Yes Yes   Sig: Take 1 capsule by mouth 3 times daily For 10 days for UTI.   cyclobenzaprine (FLEXERIL) 10 MG tablet 6/18/2023 at [facility], had one dose only Self Yes No   Sig: Take 0.5-1 tablets by mouth 3 times daily   furosemide (LASIX) 20 MG tablet 6/19/2023 at am Self Yes Yes   Sig: Take 3-4 tablets by mouth 2 times daily   gabapentin (NEURONTIN) 100 MG capsule dc-old strength Self Yes No   Sig: Take 100 mg by mouth 3 times daily 2 tabs  but dose not know the dose   gabapentin (NEURONTIN) 300 MG capsule 6/19/2023 at am Self Yes Yes   Sig: Take 600 mg by mouth 3 times daily   guaiFENesin-codeine (ROBITUSSIN AC) 100-10 MG/5ML SOLN Past Month at overnight Self Yes Yes   Sig: Take 10 mLs by mouth daily as needed for cough (if develops a cough overnight)   ibuprofen (ADVIL/MOTRIN) 200 MG tablet 6/17/2023 at am Self Yes Yes   Sig: Take 400 mg by mouth every 8 hours as needed for pain   latanoprost (XALATAN) 0.005 % ophthalmic solution 6/18/2023 at pm Self Yes Yes   Sig: Place 1 drop into the right eye every evening   lisinopril (ZESTRIL) 2.5 MG tablet 6/19/2023 at am Self Yes Yes   Sig: Take 1 tablet by mouth daily   nystatin (MYCOSTATIN) 958098 UNIT/GM external cream new, not used yet at ?  Self Yes Yes   Sig: Apply 1 Application topically 2 times daily   potassium chloride ER (KLOR-CON M) 20 MEQ CR tablet 6/19/2023 at am Self Yes Yes   Sig: Take 1 tablet by mouth daily with food   senna-docusate (SENOKOT-S/PERICOLACE) 8.6-50 MG tablet 6/17/2023 at unknown Self Yes Yes   Sig: Take 2 tablets by mouth daily as needed for constipation   temazepam (RESTORIL) 30 MG capsule 6/18/2023 at pm Self Yes Yes   Sig: Take 1 capsule by mouth At Bedtime   timolol maleate (TIMOPTIC) 0.5 % ophthalmic solution 6/19/2023 at am Self Yes Yes   Sig: Place 1 drop into the right eye every morning      Facility-Administered Medications: None     Current Facility-Administered Medications   Medication Dose Route Frequency     allopurinol  150 mg Oral Daily     apixaban ANTICOAGULANT  2.5 mg Oral BID     atorvastatin  40 mg Oral At Bedtime     cephALEXin  500 mg Oral TID     fluticasone-vilanterol  1 puff Inhalation Daily     gabapentin  300 mg Oral TID     latanoprost  1 drop Right Eye QPM     metoprolol tartrate  25 mg Oral Q6H     miconazole   Topical BID     sodium chloride (PF)  3 mL Intracatheter Q8H     temazepam  30 mg Oral At Bedtime     timolol maleate  1 drop Right Eye QAM     torsemide  40 mg Oral BID     Current Facility-Administered Medications   Medication Last Rate     - MEDICATION INSTRUCTIONS -       Allergies   Allergies   Allergen Reactions     Spironolactone Unknown     Patient doesn't remember.     Penicillins Rash     When young.       Social History        Family History         Review of Systems   A comprehensive review of system was performed and is negative other than that noted in the HPI or here.     Physical Exam   Vital Signs with Ranges  Temp:  [98.8  F (37.1  C)-99.2  F (37.3  C)] 99.1  F (37.3  C)  Pulse:  [] 101  Resp:  [14-27] 26  BP: (116-136)/(69-93) 121/92  SpO2:  [94 %-99 %] 99 %  Wt Readings from Last 4 Encounters:   06/20/23 89.6 kg (197 lb 8.5 oz)   03/25/14 96.6 kg (213 lb)     I/O  "last 3 completed shifts:  In: 349.08 [P.O.:300; I.V.:49.08]  Out: 750 [Urine:750]      Vitals: BP (!) 121/92   Pulse 101   Temp 99.1  F (37.3  C) (Oral)   Resp 26   Ht 1.791 m (5' 10.5\")   Wt 89.6 kg (197 lb 8.5 oz)   SpO2 99%   BMI 27.94 kg/m      Physical Exam:   General - Alert and in no acute distress  Respiratory -clear to auscultation with mild bibasilar crackles  Cardiovascular -irregular heart sound soft systolic murmur no rubs or gallops no edema warm extremities  Gastrointestinal - Non distended  Psych - Appropriate affect     No lab results found in last 7 days.    Invalid input(s): TROPONINIES    Recent Labs   Lab 06/20/23  1134 06/20/23  0723 06/20/23  0412 06/20/23 0403 06/20/23  0001   WBC  --   --   --  6.2 6.6   HGB  --   --   --  12.5* 12.7*   MCV  --   --   --  92 93   PLT  --   --   --  177 169   NA  --   --   --  136 136   POTASSIUM  --   --   --  3.5 3.9   CHLORIDE  --   --   --  99 98   CO2  --   --   --  27 24   BUN  --   --   --  23.9* 26.3*   CR  --   --   --  1.61* 1.72*   GFRESTIMATED  --   --   --  43* 40*   ANIONGAP  --   --   --  10 14   PASCALE  --   --   --  8.8 9.2   * 123* 116* 108* 113*   ALBUMIN  --   --   --   --  3.6   PROTTOTAL  --   --   --   --  6.5   BILITOTAL  --   --   --   --  0.6   ALKPHOS  --   --   --   --  212*   ALT  --   --   --   --  52   AST  --   --   --   --  46*     No results for input(s): CHOL, HDL, LDL, TRIG, CHOLHDLRATIO in the last 98158 hours.  Recent Labs   Lab 06/20/23 0403 06/20/23  0001   WBC 6.2 6.6   HGB 12.5* 12.7*   HCT 37.7* 38.9*   MCV 92 93    169     No results for input(s): PH, PHV, PO2, PO2V, SAT, PCO2, PCO2V, HCO3, HCO3V in the last 168 hours.  No results for input(s): NTBNPI, NTBNP in the last 168 hours.  No results for input(s): DD in the last 168 hours.  No results for input(s): SED, CRP in the last 168 hours.  Recent Labs   Lab 06/20/23  0403 06/20/23  0001    169     Recent Labs   Lab 06/20/23 0403   TSH " 2.83     No results for input(s): COLOR, APPEARANCE, URINEGLC, URINEBILI, URINEKETONE, SG, UBLD, URINEPH, PROTEIN, UROBILINOGEN, NITRITE, LEUKEST, RBCU, WBCU in the last 168 hours.    Imaging:  Recent Results (from the past 48 hour(s))   XR CHEST 1 VIEW PA OR AP    Narrative    For Patients:  As a result of the  Cures Act, medical imaging exams and procedure reports are released immediately into your electronic medical record.  You may view this report before your referring provider.  If you have questions, please contact your health care provider.      INDICATION:  Chest pain    COMPARISON:  2023    TECHNIQUE:  Single-view study 2023 at 6:21 p.m.    FINDINGS:  TUBES AND LINES: None.    HEART AND MEDIASTINUM: Mildly enlarged heart unchanged appearance.    LUNGS AND PLEURAL SPACES: Probable mild CHF. Bibasilar airspace process probably atelectasis.The pleural spaces are unremarkable.    OSSEOUS STRUCTURES: Age-appropriate appearance. No acute focal finding.    Impression    CHF pattern which is new. Bibasilar atelectasis is slightly worsened. Enlarged heart unchanged.        Dictated by Jordan Mendieta MD @ 2023 7:17:56 PM    (Electronically Signed)   Echocardiogram Complete   Result Value    LVEF  30-35%    Narrative    [id number]  [id number]  [id number]  [id number]^FLORENCIO^SHRUTHI     [facility]  Echocardiography Laboratory  [address] [place] [place] [address]     Name: DEEPALI LAUREANO  MRN: [id number]  : 1945  Study Date: 2023 11:03 AM  Age: 78 yrs  Gender: Male  Patient Location: [facility]  Reason For Study: Atrial Fibrillation  Ordering Physician: SHRUTHI MTAIAS  Performed By: Ann Delcid RDCS     BSA: 2.1 m2  Height: 71 in  Weight: 195 lb  BP: 121/92 mmHg  ______________________________________________________________________________  Procedure  Complete Portable Echo Adult. Optison (NDC #6365-8902) given  intravenously.  ______________________________________________________________________________  Interpretation Summary     1. The left ventricle is normal in size. Left ventricular systolic function is  severely reduced. The visual ejection fraction is 30-35%. Diastolic function  not assessed due to atrial fibrillation. There is moderate to severe  hypokinesis of the mid anteroseptal/inferoseptal walls and all apical segments  of the left ventricle. There is no thrombus seen in the left ventricle.  2. The right ventricle is normal size. The right ventricular systolic function  is normal.  3. Trace mitral and tricuspid regurgitation.  4. No pericardial effusion.  5. In comparison to the previous OSH report dated 10/12/2020, there has been a  mild interval deterioration in left ventricular systolic function.  ______________________________________________________________________________  Left Ventricle  The left ventricle is normal in size. Left ventricular systolic function is  severely reduced. The visual ejection fraction is 30-35%. Diastolic function  not assessed due to atrial fibrillation. There is moderate to severe  hypokinesis of the mid anteroseptal/inferoseptal walls and all apical segments  of the left ventricle. There is no thrombus seen in the left ventricle.     Right Ventricle  The right ventricle is normal size. The right ventricular systolic function is  normal.     Atria  There is mild biatrial enlargement. There is no color Doppler evidence of an  atrial shunt.     Mitral Valve  There is trace mitral regurgitation.     Tricuspid Valve  There is trace tricuspid regurgitation. The right ventricular systolic  pressure is approximated at 23.9 mmHg plus the right atrial pressure.     Aortic Valve  The aortic valve is not well visualized. No aortic regurgitation is present.  No aortic stenosis is present.     Pulmonic Valve  There is no pulmonic valvular stenosis.     Vessels  The aortic root is normal  "size. Normal size ascending aorta. The inferior vena  cava is normal.     Pericardium  There is no pericardial effusion.     Rhythm  The rhythm was atrial fibrillation.  ______________________________________________________________________________  MMode/2D Measurements & Calculations  Ao root diam: 3.5 cm  asc Aorta Diam: 3.1 cm  LA Volume (BP): 80.0 ml  LA Volume Index (BP): 38.3 ml/m2     Doppler Measurements & Calculations  MV E max omaira: 79.8 cm/sec  MV dec time: 0.13 sec  TR max omaira: 244.1 cm/sec  TR max P.9 mmHg     E/E' av.8  Lateral E/e': 7.5  Medial E/e': 12.0     ______________________________________________________________________________  Report approved by: Dior Bailon 2023 12:39 PM             Echo:  No results found for this or any previous visit (from the past 4320 hour(s)).    Clinically Significant Risk Factors Present on Admission               # Drug Induced Coagulation Defect: home medication list includes an anticoagulant medication    # Hypertension: Home medication list includes antihypertensive(s)  # Chronic heart failure with reduced ejection fraction: last echo with EF <40%     # Overweight: Estimated body mass index is 27.94 kg/m  as calculated from the following:    Height as of this encounter: 1.791 m (5' 10.5\").    Weight as of this encounter: 89.6 kg (197 lb 8.5 oz).         This note was completed in part using dictation via the Dragon voice recognition software. Some word and grammatical errors may occur and must be interpreted in the appropriate clinical context.  If there are any questions pertaining to this issue, please contact me for further clarification.      "

## 2023-06-20 NOTE — MEDICATION SCRIBE - ADMISSION MEDICATION HISTORY
Medication Scribe Admission Medication History    Admission medication history is complete. The information provided in this note is only as accurate as the sources available at the time of the update.    Medication reconciliation/reorder completed by provider prior to medication history? Yes    Information Source(s): Patient, Clinic records, Hospital records and CareEverywhere/SureScripts via in-person    Pertinent Information: Patient cuts open the Temazepam and puts the powder directly in his mouth.  Takes Robitussin AC only overnight if needed for dry cough.  Only takes 4 tabs of the Furosemide if his watch band is tight.  Only taking a half tab of Allopurinol for kidney protection.  Started Cephalexin on 6/18/23 for UTI.  Only got one dose in.    Changes made to PTA medication list:    Added: Potassium 20 meq, Atorvastatin 40 mg, Apixaban 2.5 mg, Timolol 0/5%, Lisinopril 2.5 mg, Furosemide 20 mg, Symbicort Inhaler, Temapepam 30 mg, Cyclobenzaprine 10 mg, Cephalexin 500 mg, Nystatin Cream, Ibuprofen 200 mg, Gabapentin 300 mg, Menthol(Teja), White Petrolatum gel, Carvedilol 3.125 mg, Latanoprost 0.005%, Senna-Docusate.    Deleted: Albuterol Neb, Apixaban 5 mg, Benazepril 10 mg, Benzonatate 100 mg, Carvedilol 6.25 mg, Fexofenadine 180 mg, Fluticasone nasal spray, Lorazepam 0.5 mg, Calmoseptine, Morphine 7.5 mg, Nortriptyline 10 mg, Polyethylene Glycol, Potassium Chloride PO, Pravastatin 40 mg, Spironolactone 25 mg, Temazepam 7.5 mg, Warfarin Sodium PO, Zolpidem 5 mg.    Changed: Robitussin AC from q4h prn to daily prn.  Allopurinol from 300 mg daily to 150 mg daily.      Medication Affordability:  Not including over the counter (OTC) medications, was there a time in the past 3 months when you did not take your medications as prescribed because of cost?: No    Allergies reviewed with patient and updates made in EHR: yes, added rash when young to PCN.    Medication History Completed By: Lorrie Marquez 6/20/2023  2:35 PM    Prior to Admission medications    Medication Sig Last Dose Taking? Auth Provider Long Term End Date   albuterol (PROAIR HFA, PROVENTIL HFA, VENTOLIN HFA) 108 (90 BASE) MCG/ACT inhaler Inhale 2 puffs into the lungs 4 times daily as needed 6/19/2023 at afternoon Yes Reported, Patient Yes    allopurinol (ZYLOPRIM) 300 MG tablet Take 150 mg by mouth daily 6/19/2023 at 1800 Yes Reported, Patient     atorvastatin (LIPITOR) 40 MG tablet Take 40 mg by mouth At Bedtime 6/18/2023 at hs Yes Reported, Patient     Calcium Carb-Cholecalciferol (CALCIUM CARBONATE-VITAMIN D3) 600-400 MG-UNIT TABS Take 1 tablet by mouth 2 times daily 6/19/2023 at am Yes Reported, Patient     carvedilol (COREG) 3.125 MG tablet Take 2 tablets by mouth 2 times daily (with meals) 6/19/2023 at am Yes Reported, Patient Yes    cephALEXin (KEFLEX) 500 MG capsule Take 1 capsule by mouth 3 times daily For 10 days for UTI. 6/18/2023 at new, had one dose only Yes Reported, Patient     ELIQUIS ANTICOAGULANT 2.5 MG tablet Take 1 tablet by mouth 2 times daily Apixaban 6/19/2023 at am Yes Reported, Patient     furosemide (LASIX) 20 MG tablet Take 3-4 tablets by mouth 2 times daily 6/19/2023 at am Yes Reported, Patient Yes    gabapentin (NEURONTIN) 300 MG capsule Take 600 mg by mouth 3 times daily 6/19/2023 at am Yes Reported, Patient Yes    guaiFENesin-codeine (ROBITUSSIN AC) 100-10 MG/5ML SOLN Take 10 mLs by mouth daily as needed for cough (if develops a cough overnight) Past Month at overnight Yes Reported, Patient     ibuprofen (ADVIL/MOTRIN) 200 MG tablet Take 400 mg by mouth every 8 hours as needed for pain 6/17/2023 at am Yes Reported, Patient     latanoprost (XALATAN) 0.005 % ophthalmic solution Place 1 drop into the right eye every evening 6/18/2023 at pm Yes Reported, Patient Yes    lisinopril (ZESTRIL) 2.5 MG tablet Take 1 tablet by mouth daily 6/19/2023 at am Yes Reported, Patient Yes    Menthol, Topical Analgesic, 6 % GEL Apply 1 Application  topically daily as needed Teja Cool Gel Therapy for thigh pain. 6/18/2023 at am Yes Reported, Patient     nystatin (MYCOSTATIN) 934632 UNIT/GM external cream Apply 1 Application topically 2 times daily new, not used yet at ? Yes Reported, Patient     potassium chloride ER (KLOR-CON M) 20 MEQ CR tablet Take 1 tablet by mouth daily with food 6/19/2023 at am Yes Reported, Patient     senna-docusate (SENOKOT-S/PERICOLACE) 8.6-50 MG tablet Take 2 tablets by mouth daily as needed for constipation 6/17/2023 at unknown Yes Reported, Patient     SYMBICORT 80-4.5 MCG/ACT Inhaler Inhale 2 puffs into the lungs 2 times daily 6/18/2023 at pm Yes Reported, Patient Yes    temazepam (RESTORIL) 30 MG capsule Take 1 capsule by mouth At Bedtime 6/18/2023 at pm Yes Reported, Patient     timolol maleate (TIMOPTIC) 0.5 % ophthalmic solution Place 1 drop into the right eye every morning 6/19/2023 at am Yes Reported, Patient     White Petrolatum (WHITE PETROLEUM JELLY) ointment Apply 1 Application topically 4 times daily as needed To pressure ulcer Past Month at unknown Yes Reported, Patient     apixaban ANTICOAGULANT (ELIQUIS) 5 MG tablet Take 5 mg by mouth 2 times daily Pt not sure of dose dc-wrong strength  Reported, Patient     cyclobenzaprine (FLEXERIL) 10 MG tablet Take 0.5-1 tablets by mouth 3 times daily 6/18/2023 at new, had one dose only  Reported, Patient     gabapentin (NEURONTIN) 100 MG capsule Take 100 mg by mouth 3 times daily 2 tabs  but dose not know the dose dc-old strength  Reported, Patient Yes

## 2023-06-20 NOTE — PROGRESS NOTES
Metoprolol 25mg PO added this morning. Patient seems to be in Aflutter currently with rate decreasing into 70's with frequent PVC's.. EKG ordered by MD. Diltiazem Gtt turned down to 5 mg/hr. Monitor

## 2023-06-21 VITALS
DIASTOLIC BLOOD PRESSURE: 84 MMHG | OXYGEN SATURATION: 99 % | HEART RATE: 86 BPM | RESPIRATION RATE: 18 BRPM | BODY MASS INDEX: 27.53 KG/M2 | WEIGHT: 196.65 LBS | HEIGHT: 71 IN | SYSTOLIC BLOOD PRESSURE: 113 MMHG | TEMPERATURE: 97.8 F

## 2023-06-21 LAB
ALBUMIN SERPL BCG-MCNC: 3.6 G/DL (ref 3.5–5.2)
ALP SERPL-CCNC: 237 U/L (ref 40–129)
ALT SERPL W P-5'-P-CCNC: 70 U/L (ref 0–70)
ANION GAP SERPL CALCULATED.3IONS-SCNC: 9 MMOL/L (ref 7–15)
AST SERPL W P-5'-P-CCNC: 57 U/L (ref 0–45)
BILIRUB DIRECT SERPL-MCNC: 0.5 MG/DL (ref 0–0.3)
BILIRUB SERPL-MCNC: 1 MG/DL
BUN SERPL-MCNC: 26.6 MG/DL (ref 8–23)
CALCIUM SERPL-MCNC: 9 MG/DL (ref 8.8–10.2)
CHLORIDE SERPL-SCNC: 99 MMOL/L (ref 98–107)
CREAT SERPL-MCNC: 1.75 MG/DL (ref 0.67–1.17)
DEPRECATED HCO3 PLAS-SCNC: 28 MMOL/L (ref 22–29)
ERYTHROCYTE [DISTWIDTH] IN BLOOD BY AUTOMATED COUNT: 15.8 % (ref 10–15)
GFR SERPL CREATININE-BSD FRML MDRD: 39 ML/MIN/1.73M2
GLUCOSE SERPL-MCNC: 112 MG/DL (ref 70–99)
HCT VFR BLD AUTO: 38.5 % (ref 40–53)
HGB BLD-MCNC: 12.5 G/DL (ref 13.3–17.7)
MAGNESIUM SERPL-MCNC: 2 MG/DL (ref 1.7–2.3)
MCH RBC QN AUTO: 30.3 PG (ref 26.5–33)
MCHC RBC AUTO-ENTMCNC: 32.5 G/DL (ref 31.5–36.5)
MCV RBC AUTO: 93 FL (ref 78–100)
PHOSPHATE SERPL-MCNC: 3.3 MG/DL (ref 2.5–4.5)
PLATELET # BLD AUTO: 192 10E3/UL (ref 150–450)
POTASSIUM SERPL-SCNC: 4.3 MMOL/L (ref 3.4–5.3)
PROT SERPL-MCNC: 6.3 G/DL (ref 6.4–8.3)
RBC # BLD AUTO: 4.12 10E6/UL (ref 4.4–5.9)
SODIUM SERPL-SCNC: 136 MMOL/L (ref 136–145)
WBC # BLD AUTO: 6.6 10E3/UL (ref 4–11)

## 2023-06-21 PROCEDURE — 82248 BILIRUBIN DIRECT: CPT | Performed by: HOSPITALIST

## 2023-06-21 PROCEDURE — 250N000013 HC RX MED GY IP 250 OP 250 PS 637: Performed by: INTERNAL MEDICINE

## 2023-06-21 PROCEDURE — 84100 ASSAY OF PHOSPHORUS: CPT | Performed by: HOSPITALIST

## 2023-06-21 PROCEDURE — 80053 COMPREHEN METABOLIC PANEL: CPT | Performed by: HOSPITALIST

## 2023-06-21 PROCEDURE — 36415 COLL VENOUS BLD VENIPUNCTURE: CPT | Performed by: HOSPITALIST

## 2023-06-21 PROCEDURE — 250N000013 HC RX MED GY IP 250 OP 250 PS 637

## 2023-06-21 PROCEDURE — 85027 COMPLETE CBC AUTOMATED: CPT | Performed by: HOSPITALIST

## 2023-06-21 PROCEDURE — 83735 ASSAY OF MAGNESIUM: CPT | Performed by: HOSPITALIST

## 2023-06-21 PROCEDURE — 250N000013 HC RX MED GY IP 250 OP 250 PS 637: Performed by: HOSPITALIST

## 2023-06-21 PROCEDURE — 99239 HOSP IP/OBS DSCHRG MGMT >30: CPT | Performed by: HOSPITALIST

## 2023-06-21 RX ORDER — METOPROLOL TARTRATE 50 MG
50 TABLET ORAL 2 TIMES DAILY
Qty: 60 TABLET | Refills: 1 | Status: SHIPPED | OUTPATIENT
Start: 2023-06-21

## 2023-06-21 RX ORDER — GABAPENTIN 300 MG/1
300 CAPSULE ORAL 3 TIMES DAILY
Start: 2023-06-21

## 2023-06-21 RX ORDER — METOPROLOL TARTRATE 25 MG/1
50 TABLET, FILM COATED ORAL 2 TIMES DAILY
Status: DISCONTINUED | OUTPATIENT
Start: 2023-06-21 | End: 2023-06-21 | Stop reason: HOSPADM

## 2023-06-21 RX ADMIN — METOPROLOL TARTRATE 25 MG: 25 TABLET, FILM COATED ORAL at 01:20

## 2023-06-21 RX ADMIN — TIMOLOL MALEATE 1 DROP: 5 SOLUTION/ DROPS OPHTHALMIC at 08:06

## 2023-06-21 RX ADMIN — SENNOSIDES AND DOCUSATE SODIUM 2 TABLET: 50; 8.6 TABLET ORAL at 04:29

## 2023-06-21 RX ADMIN — CEPHALEXIN 500 MG: 500 CAPSULE ORAL at 08:05

## 2023-06-21 RX ADMIN — ALLOPURINOL 150 MG: 300 TABLET ORAL at 08:06

## 2023-06-21 RX ADMIN — APIXABAN 2.5 MG: 2.5 TABLET, FILM COATED ORAL at 08:05

## 2023-06-21 RX ADMIN — FLUTICASONE FUROATE AND VILANTEROL TRIFENATATE 1 PUFF: 100; 25 POWDER RESPIRATORY (INHALATION) at 05:56

## 2023-06-21 RX ADMIN — MICONAZOLE NITRATE: 20 POWDER TOPICAL at 08:07

## 2023-06-21 RX ADMIN — METOPROLOL TARTRATE 50 MG: 25 TABLET, FILM COATED ORAL at 08:05

## 2023-06-21 RX ADMIN — SIMETHICONE 80 MG: 80 TABLET, CHEWABLE ORAL at 04:29

## 2023-06-21 RX ADMIN — GABAPENTIN 600 MG: 300 CAPSULE ORAL at 08:05

## 2023-06-21 RX ADMIN — TORSEMIDE 40 MG: 20 TABLET ORAL at 08:05

## 2023-06-21 ASSESSMENT — ACTIVITIES OF DAILY LIVING (ADL)
ADLS_ACUITY_SCORE: 24
ADLS_ACUITY_SCORE: 26
ADLS_ACUITY_SCORE: 24
ADLS_ACUITY_SCORE: 26
ADLS_ACUITY_SCORE: 26
ADLS_ACUITY_SCORE: 24
ADLS_ACUITY_SCORE: 24

## 2023-06-21 NOTE — PLAN OF CARE
"Goal Outcome Evaluation:         Pt states he feels like he cannot get \"air in\" difficulty breathing. He is sitting with HOB elevated, RR20 and sats on RA 95-96% .lungs are clear anterior with some very fine crackles at basses, no cough.applied 2 LNC for pt comfort/anxiety.               "

## 2023-06-21 NOTE — PROGRESS NOTES
[facility] [facility] DISCHARGE NOTE    Patient discharged to home at 1:25 PM via wheel chair. Accompanied by spouse and staff. Discharge instructions reviewed with patient and spouse, opportunity offered to ask questions. Prescriptions sent to patients preferred pharmacy. All belongings sent with patient.  Pt discharged in stable condition.    Jennifer Hunt RN

## 2023-06-21 NOTE — PLAN OF CARE
"Goal Outcome Evaluation:  Pt was yelling out for Erica. He states he can see her lights on and wanted to have her help him. Reminded pt that he was in the hospital, he didn't understand right away but did realize that his wife was not in the room. Pt c/o upper leg pain\"pinched nerves\" per pt. Medicated with tylenol and assist to reposition some but pt states he sleeps only on his back with HOB elevated.                      "

## 2023-06-21 NOTE — PLAN OF CARE
Goal Outcome Evaluation:         Pt has been confused again, he used the hospital phone and dialed the operater for help instead of using the call light. Pt instructed again on how to use the call light for assist.

## 2023-06-21 NOTE — PLAN OF CARE
Goal Outcome Evaluation: pt woke up again but is not confused this time. He states he feels good, better,. His issue now is no BM and he was given senna tabs. Discussed getting up and walking to the BR when he feels the urge to go . Reminded pt to use the call light. He is able to semi shift weight in bed independently. He talks about going home later today.

## 2023-06-21 NOTE — PROGRESS NOTES
Skin affirmation note    Admitting nurse completed full skin assessment, Bartolome score and Bartolome interventions. This writer agrees with the initial skin assessment findings.

## 2023-06-21 NOTE — PLAN OF CARE
Goal Outcome Evaluation:  Pt up to bedside commode, minimal help to get OOB. Stood stong. No BM, no passing gas. Pt states some discomfort and he did get SOB but no distress. He states he will call his wife at 7 AM to have her bring clean clothes and a clean leg bag.

## 2023-06-21 NOTE — PROGRESS NOTES
Pt ambulated in room w/ walker to assess hr w/ activity - pt ambulated usual amt at home, hr 80-91 max w/ slight sob w/ activity which he states is normal for him.  Dr. Boyer updated.

## 2023-06-21 NOTE — DISCHARGE SUMMARY
[facility]  Hospitalist Discharge Summary      Date of Admission:  6/19/2023  Date of Discharge:  6/21/2023  Discharging Provider: Nir Boyer MD  Discharge Service: Hospitalist Service    Discharge Diagnoses   Atrial Flutter with RVR  New Onset Atrial Fibrillation/Flutter    Follow-ups Needed After Discharge   Follow-up Appointments     Follow-up and recommended labs and tests       Follow up with primary care provider, Jaylan Cosby, within 5-7 days   for hospital follow- up.  The following labs/tests are recommended: CBC,   BMP, Magnesium.  You need to follow-up with a cardiologist which you are PCP can refer you   to 1, I would rather have you see them sooner rather than later.  I would   recommend following up in [place] in case they do need to do a   cardioversion.        With do EKG at the outpatient PCP visit    Unresulted Labs Ordered in the Past 30 Days of this Admission     No orders found from 5/20/2023 to 6/20/2023.      These results will be followed up by NA    Discharge Disposition   Discharged to home  Condition at discharge: Stable    Hospital Course   78-year-old male with history of HFrEF (30-35%), neurogenic bladder s/p suprapubic catheter, HTN, HLD, CAD s/p PCI, OA s/p TKA, history of LLE DVT on Eliquis, cataract, diabetes mellitus type 2, and gout presented as a transfer from [facility] on 6/20 after initially presenting with dyspnea, and found to be in atrial fibrillation with RVR, -140s.  Started on diltiazem drip, also treated with dose of Lasix and Ceftriaxone due to possible UTI, and transferred to [facility]. Shortly after arrival at [facility], patient seemed to go into rate controlled atrial flutter.  Cardiology was consulted to assess whether patient would be a candidate for cardioversion given rate controlled flutter. TTE was done which noted EF 30-35%, moderate-severe hypokinesis of anteroseptal/septal walls, trace MR and TR, no  pericardial effusion.  Was switched from home Lasix to torsemide per cardiology recommendations.  On discharge patient was in atrial flutter but rate controlled.  Will need outpatient cardiology follow-up which the patient opted to do it in [place] which is part of his health at work; was instructed that he may need a cardioversion.      Atrial Flutter with RVR  New Onset Atrial Fibrillation/Flutter  Non-MI Troponin Elevation  Patient did not have demand ischemia as he did not have any signs/symptoms of ischemia. EKG was non-ischemic. Non-MI troponin elevation 2/2 Aflutter and CKD.  -Metoprolol Tatrate 50 mg BID on disharge  -Already on Eliquis for DVT  -Recheck EKG as outpatient  -Will need outpatient cardiology evaluation    Acute on Chronic HFrEF (30-35%)  Hx of ICM  CAD s/p PCI  HTN  -Switch from Lasix to Torsemide 40 mg twice daily  -Continue Lisinopril 2.5 mg Daily  -Switched Coreg to Metoprolol given Aflutter, consider switching back to Carvedilol as outpatient  -Continue Lipitor    Hx of LLE DVT  -Continue home Eliquis 2.5 mg BID (age > 80, Cr > 1.5)    Possible UTI  Neurogenic Bladder s/p Suprapubic Catheter  -Keflex 500 mg TID (resumed from PTA)    Hx of Diabetes Mellitus Type 2  Neuropathy  Not on any meds PTA. A1c 5.9% (6/20/23)  -Gabapentin adjusted to 300 mg TID (renal dose)    Chronic Problems  Gout  -Continue home Allopurinol    Cataract  -Continue home eye drops    CKD Stage 3b  Recent lab work shows Cr ~ 1.9-2. Cr 1.7-1.8 on admission at [facility].  -Recheck BMP while on Torsemide    Insomnia  -Continue Temazepam 30 mg at bedtime    Miscellaneous  - Breo (home Symbicort); unclear if takes it for COPD or asthma      Consultations This Hospital Stay   PHARMACY TO DOSE WARFARIN  CARDIOLOGY IP CONSULT    Code Status   No CPR- Do NOT Intubate    Time Spent on this Encounter   INir MD, personally saw the patient today and spent greater than 30 minutes discharging this patient.       Nir  MD Rosalind  [facility]  [address] [address]  Phone: [phone]  Fax: [phone]  ______________________________________________________________________    Physical Exam   Vital Signs: Temp: 97.8  F (36.6  C) Temp src: Oral BP: 113/84 Pulse: 86   Resp: 18 SpO2: 99 % O2 Device: None (Room air) Oxygen Delivery: 2 LPM  Weight: 196 lbs 10.41 oz    General: no acute distress  CV: +S1/S2, no significant pitting edema  Respiratory: clear to auscultation bilaterally  GI: soft, non-tender, ND  Neuro: alert       Primary Care Physician   Jaylan Cosby    Discharge Orders      Follow-Up with Cardiology      Reason for your hospital stay    Irregular heart rhythm which initially was thought to be atrial fibrillation but appears its something called atrial flutter.  We started you on a medication which is similar to your carvedilol but it is called metoprolol, and you should take the metoprolol instead of the carvedilol.  This helps control your heart rate.  You were seen by a cardiologist who recommended you follow-up with a cardiologist after discharge to see whether they need to switch you to a different medication to try to get you back into normal sinus rhythm which can also be done with cardioversion.  You were in atrial flutter on discharge but your heart rate was controlled.  You are on a blood thinner for your clot from last year and this will help prevent against the clot from forming from the atrial flutter/fibrillation.  Additionally, we switched your water pill, furosemide/Lasix, to a similar pill called torsemide which you should take twice a day.     Follow-up and recommended labs and tests     Follow up with primary care provider, Jaylan Cosby, within 5-7 days for hospital follow- up.  The following labs/tests are recommended: CBC, BMP, Magnesium.  You need to follow-up with a cardiologist which you are PCP can refer you to 1, I would rather have  you see them sooner rather than later.  I would recommend following up in [place] in case they do need to do a cardioversion.     Activity    Your activity upon discharge: activity as tolerated     Diet    Follow this diet upon discharge:  2 Gram Sodium Diet       Significant Results and Procedures   Most Recent 3 CBC's:  Recent Labs   Lab Test 23  0516 23  0403 23  0001   WBC 6.6 6.2 6.6   HGB 12.5* 12.5* 12.7*   MCV 93 92 93    177 169     Most Recent 3 BMP's:  Recent Labs   Lab Test 23  0516 23  1134 23  0723 23  0412 23  0403 23  0001     --   --   --  136 136   POTASSIUM 4.3  --   --   --  3.5 3.9   CHLORIDE 99  --   --   --  99 98   CO2 28  --   --   --  27 24   BUN 26.6*  --   --   --  23.9* 26.3*   CR 1.75*  --   --   --  1.61* 1.72*   ANIONGAP 9  --   --   --  10 14   PASCALE 9.0  --   --   --  8.8 9.2   * 127* 123*   < > 108* 113*    < > = values in this interval not displayed.     Most Recent 2 LFT's:  Recent Labs   Lab Test 23  0516 23  0001   AST 57* 46*   ALT 70 52   ALKPHOS 237* 212*   BILITOTAL 1.0 0.6     Most Recent 3 INR's:No lab results found.,   Results for orders placed or performed during the hospital encounter of 23   Echocardiogram Complete     Value    LVEF  30-35%    Narrative    [id number]  [id number]  [id number]  [id number]^FLORENCIO^SHRUTHI     [facility]  Echocardiography Laboratory  [address] [place] [address]     Name: DEEPALI LAUREANO  MRN: [id number]  : 1945  Study Date: 2023 11:03 AM  Age: 78 yrs  Gender: Male  Patient Location: [facility]  Reason For Study: Atrial Fibrillation  Ordering Physician: SHRUTHI MATIAS  Performed By: Ann Delcid RDCS     BSA: 2.1 m2  Height: 71 in  Weight: 195 lb  BP: 121/92 mmHg  ______________________________________________________________________________  Procedure  Complete Portable Echo Adult. Optison (NDC #[id number]) given  intravenously.  ______________________________________________________________________________  Interpretation Summary     1. The left ventricle is normal in size. Left ventricular systolic function is  severely reduced. The visual ejection fraction is 30-35%. Diastolic function  not assessed due to atrial fibrillation. There is moderate to severe  hypokinesis of the mid anteroseptal/inferoseptal walls and all apical segments  of the left ventricle. There is no thrombus seen in the left ventricle.  2. The right ventricle is normal size. The right ventricular systolic function  is normal.  3. Trace mitral and tricuspid regurgitation.  4. No pericardial effusion.  5. In comparison to the previous OSH report dated 10/12/2020, there has been a  mild interval deterioration in left ventricular systolic function.  ______________________________________________________________________________  Left Ventricle  The left ventricle is normal in size. Left ventricular systolic function is  severely reduced. The visual ejection fraction is 30-35%. Diastolic function  not assessed due to atrial fibrillation. There is moderate to severe  hypokinesis of the mid anteroseptal/inferoseptal walls and all apical segments  of the left ventricle. There is no thrombus seen in the left ventricle.     Right Ventricle  The right ventricle is normal size. The right ventricular systolic function is  normal.     Atria  There is mild biatrial enlargement. There is no color Doppler evidence of an  atrial shunt.     Mitral Valve  There is trace mitral regurgitation.     Tricuspid Valve  There is trace tricuspid regurgitation. The right ventricular systolic  pressure is approximated at 23.9 mmHg plus the right atrial pressure.     Aortic Valve  The aortic valve is not well visualized. No aortic regurgitation is present.  No aortic stenosis is present.     Pulmonic Valve  There is no pulmonic valvular stenosis.     Vessels  The aortic root is normal  size. Normal size ascending aorta. The inferior vena  cava is normal.     Pericardium  There is no pericardial effusion.     Rhythm  The rhythm was atrial fibrillation.  ______________________________________________________________________________  MMode/2D Measurements & Calculations  Ao root diam: 3.5 cm  asc Aorta Diam: 3.1 cm  LA Volume (BP): 80.0 ml  LA Volume Index (BP): 38.3 ml/m2     Doppler Measurements & Calculations  MV E max omaira: 79.8 cm/sec  MV dec time: 0.13 sec  TR max omaira: 244.1 cm/sec  TR max P.9 mmHg     E/E' av.8  Lateral E/e': 7.5  Medial E/e': 12.0     ______________________________________________________________________________  Report approved by: Dior Bailon 2023 12:39 PM               Discharge Medications   Current Discharge Medication List      START taking these medications    Details   metoprolol tartrate (LOPRESSOR) 50 MG tablet Take 1 tablet (50 mg) by mouth 2 times daily  Qty: 60 tablet, Refills: 1    Associated Diagnoses: Atrial fibrillation with RVR (H)      torsemide 40 MG TABS Take 40 mg by mouth 2 times daily  Qty: 60 tablet, Refills: 1    Associated Diagnoses: Atrial fibrillation with RVR (H)         CONTINUE these medications which have CHANGED    Details   gabapentin (NEURONTIN) 300 MG capsule Take 1 capsule (300 mg) by mouth 3 times daily    Associated Diagnoses: Neuropathy         CONTINUE these medications which have NOT CHANGED    Details   albuterol (PROAIR HFA, PROVENTIL HFA, VENTOLIN HFA) 108 (90 BASE) MCG/ACT inhaler Inhale 2 puffs into the lungs 4 times daily as needed      allopurinol (ZYLOPRIM) 300 MG tablet Take 150 mg by mouth daily      atorvastatin (LIPITOR) 40 MG tablet Take 40 mg by mouth At Bedtime      Calcium Carb-Cholecalciferol (CALCIUM CARBONATE-VITAMIN D3) 600-400 MG-UNIT TABS Take 1 tablet by mouth 2 times daily      cephALEXin (KEFLEX) 500 MG capsule Take 1 capsule by mouth 3 times daily For 10 days for UTI.      cyclobenzaprine  (FLEXERIL) 10 MG tablet Take 0.5-1 tablets by mouth 3 times daily      ELIQUIS ANTICOAGULANT 2.5 MG tablet Take 1 tablet by mouth 2 times daily Apixaban      guaiFENesin-codeine (ROBITUSSIN AC) 100-10 MG/5ML SOLN Take 10 mLs by mouth daily as needed for cough (if develops a cough overnight)      latanoprost (XALATAN) 0.005 % ophthalmic solution Place 1 drop into the right eye every evening      lisinopril (ZESTRIL) 2.5 MG tablet Take 1 tablet by mouth daily      Menthol, Topical Analgesic, 6 % GEL Apply 1 Application topically daily as needed Teja Cool Gel Therapy for thigh pain.      nystatin (MYCOSTATIN) 122686 UNIT/GM external cream Apply 1 Application topically 2 times daily      potassium chloride ER (KLOR-CON M) 20 MEQ CR tablet Take 1 tablet by mouth daily with food      senna-docusate (SENOKOT-S/PERICOLACE) 8.6-50 MG tablet Take 2 tablets by mouth daily as needed for constipation      SYMBICORT 80-4.5 MCG/ACT Inhaler Inhale 2 puffs into the lungs 2 times daily      temazepam (RESTORIL) 30 MG capsule Take 1 capsule by mouth At Bedtime      timolol maleate (TIMOPTIC) 0.5 % ophthalmic solution Place 1 drop into the right eye every morning      White Petrolatum (WHITE PETROLEUM JELLY) ointment Apply 1 Application topically 4 times daily as needed To pressure ulcer         STOP taking these medications       carvedilol (COREG) 3.125 MG tablet Comments:   Reason for Stopping:         furosemide (LASIX) 20 MG tablet Comments:   Reason for Stopping:         ibuprofen (ADVIL/MOTRIN) 200 MG tablet Comments:   Reason for Stopping:             Allergies   Allergies   Allergen Reactions     Spironolactone Unknown     Patient doesn't remember.     Penicillins Rash     When young.

## 2023-06-22 ENCOUNTER — PATIENT OUTREACH (OUTPATIENT)
Dept: CARE COORDINATION | Facility: CLINIC | Age: 78
End: 2023-06-22
Payer: COMMERCIAL

## 2023-06-22 NOTE — PROGRESS NOTES
Clinic Care Coordination Contact  [facility]: Post-Discharge Note  SITUATION                                                      Admission:    Admission Date: 06/19/23   Reason for Admission: Irregular heart rhythm which initially was thought to be atrial fibrillation but appears its something called atrial flutter.  Discharge:   Discharge Date: 06/21/23  Discharge Diagnosis: Atrial fibrillation with RVR (H)    BACKGROUND                                                      Per hospital discharge summary and inpatient provider notes:  78-year-old male with history of HFrEF (30-35%), neurogenic bladder s/p suprapubic catheter, HTN, HLD, CAD s/p PCI, OA s/p TKA, history of LLE DVT on Eliquis, cataract, diabetes mellitus type 2, and gout presented as a transfer from [facility] on 6/20 after initially presenting with dyspnea, and found to be in atrial fibrillation with RVR, -140s.  Started on diltiazem drip, also treated with dose of Lasix and Ceftriaxone due to possible UTI, and transferred to . Shortly after arrival at [facility], patient seemed to go into rate controlled atrial flutter.  Cardiology was consulted to assess whether patient would be a candidate for cardioversion given rate controlled flutter. TTE was done which noted EF 30-35%, moderate-severe hypokinesis of anteroseptal/septal walls, trace MR and TR, no pericardial effusion.  Was switched from home Lasix to torsemide per cardiology recommendations.  On discharge patient was in atrial flutter but rate controlled.  Will need outpatient cardiology follow-up which the patient opted to do it in [place] which is part of his health at work; was instructed that he may need a cardioversion.    ASSESSMENT           Discharge Assessment  How are you doing now that you are home?: low energy a bit concerned about how the doctor released me,  How are your symptoms? (Red Flag symptoms escalate to triage hotline per guidelines):  Improved  Do you feel your condition is stable enough to be safe at home until your provider visit?: Yes  Does the patient have their discharge instructions? : Yes  Does the patient have questions regarding their discharge instructions? : No  Were you started on any new medications or were there changes to any of your previous medications? : Yes  Does the patient have all of their medications?: Yes  Do you have questions regarding any of your medications? : No  Do you have all of your needed medical supplies or equipment (DME)?  (i.e. oxygen tank, CPAP, cane, etc.): Yes  Discharge follow-up appointment scheduled within 14 calendar days? : Yes                  PLAN                                                      Outpatient Plan:  Follow-up Appointments     Follow-up and recommended labs and tests       Follow up with primary care provider, Jaylan Cosby, within 5-7 days   for hospital follow- up.  The following labs/tests are recommended: CBC,   BMP, Magnesium.  You need to follow-up with a cardiologist which you are PCP can refer you   to 1, I would rather have you see them sooner rather than later.  I would   recommend following up in [place] in case they do need to do a   cardioversion.        With do EKG at the outpatient PCP visit    No future appointments.      For any urgent concerns, please contact our 24 hour nurse triage line: [phone] ([phone])         Sherrie Zhao MA